# Patient Record
Sex: MALE | Race: WHITE | NOT HISPANIC OR LATINO | URBAN - METROPOLITAN AREA
[De-identification: names, ages, dates, MRNs, and addresses within clinical notes are randomized per-mention and may not be internally consistent; named-entity substitution may affect disease eponyms.]

---

## 2017-03-07 ENCOUNTER — OUTPATIENT (OUTPATIENT)
Dept: OUTPATIENT SERVICES | Facility: HOSPITAL | Age: 62
LOS: 1 days | End: 2017-03-07
Payer: MEDICARE

## 2017-03-07 ENCOUNTER — APPOINTMENT (OUTPATIENT)
Dept: BARIATRICS | Facility: CLINIC | Age: 62
End: 2017-03-07

## 2017-03-07 VITALS
BODY MASS INDEX: 47.84 KG/M2 | DIASTOLIC BLOOD PRESSURE: 76 MMHG | HEIGHT: 63 IN | SYSTOLIC BLOOD PRESSURE: 144 MMHG | WEIGHT: 270 LBS

## 2017-03-07 DIAGNOSIS — I87.8 OTHER SPECIFIED DISORDERS OF VEINS: ICD-10-CM

## 2017-03-07 DIAGNOSIS — Z00.00 ENCOUNTER FOR GENERAL ADULT MEDICAL EXAMINATION WITHOUT ABNORMAL FINDINGS: ICD-10-CM

## 2017-03-07 PROCEDURE — 74220 X-RAY XM ESOPHAGUS 1CNTRST: CPT | Mod: 26

## 2017-03-07 PROCEDURE — 74220 X-RAY XM ESOPHAGUS 1CNTRST: CPT

## 2017-03-07 PROCEDURE — 71020: CPT | Mod: 26

## 2017-03-07 PROCEDURE — 71046 X-RAY EXAM CHEST 2 VIEWS: CPT

## 2017-03-07 RX ORDER — OXYCODONE AND ACETAMINOPHEN 10; 325 MG/1; MG/1
10-325 TABLET ORAL
Qty: 200 | Refills: 0 | Status: ACTIVE | COMMUNITY
Start: 2016-10-25

## 2017-03-07 RX ORDER — CLOTRIMAZOLE AND BETAMETHASONE DIPROPIONATE 10; .5 MG/G; MG/G
1-0.05 CREAM TOPICAL
Qty: 45 | Refills: 0 | Status: ACTIVE | COMMUNITY
Start: 2015-11-17

## 2017-03-07 RX ORDER — LACTULOSE 10 G/15ML
10 SOLUTION ORAL
Qty: 473 | Refills: 0 | Status: ACTIVE | COMMUNITY
Start: 2016-09-12

## 2017-03-07 RX ORDER — GABAPENTIN 300 MG/1
300 CAPSULE ORAL
Qty: 300 | Refills: 0 | Status: ACTIVE | COMMUNITY
Start: 2016-09-22

## 2017-03-07 RX ORDER — DEXTROAMPHETAMINE SACCHARATE, AMPHETAMINE ASPARTATE, DEXTROAMPHETAMINE SULFATE AND AMPHETAMINE SULFATE 7.5; 7.5; 7.5; 7.5 MG/1; MG/1; MG/1; MG/1
30 TABLET ORAL
Qty: 60 | Refills: 0 | Status: ACTIVE | COMMUNITY
Start: 2016-10-03

## 2017-03-08 DIAGNOSIS — R07.89 OTHER CHEST PAIN: ICD-10-CM

## 2019-03-07 ENCOUNTER — APPOINTMENT (OUTPATIENT)
Dept: BARIATRICS | Facility: CLINIC | Age: 64
End: 2019-03-07
Payer: MEDICARE

## 2019-03-07 VITALS
BODY MASS INDEX: 53.52 KG/M2 | WEIGHT: 302.03 LBS | OXYGEN SATURATION: 95 % | DIASTOLIC BLOOD PRESSURE: 80 MMHG | HEIGHT: 63 IN | SYSTOLIC BLOOD PRESSURE: 140 MMHG | HEART RATE: 88 BPM

## 2019-03-07 PROCEDURE — 99214 OFFICE O/P EST MOD 30 MIN: CPT

## 2019-03-25 ENCOUNTER — OUTPATIENT (OUTPATIENT)
Dept: OUTPATIENT SERVICES | Facility: HOSPITAL | Age: 64
LOS: 1 days | End: 2019-03-25
Payer: MEDICARE

## 2019-03-25 VITALS
TEMPERATURE: 98 F | RESPIRATION RATE: 14 BRPM | DIASTOLIC BLOOD PRESSURE: 78 MMHG | HEIGHT: 69 IN | WEIGHT: 300.05 LBS | HEART RATE: 82 BPM | SYSTOLIC BLOOD PRESSURE: 126 MMHG | OXYGEN SATURATION: 99 %

## 2019-03-25 DIAGNOSIS — K95.09 OTHER COMPLICATIONS OF GASTRIC BAND PROCEDURE: ICD-10-CM

## 2019-03-25 DIAGNOSIS — Z98.84 BARIATRIC SURGERY STATUS: ICD-10-CM

## 2019-03-25 DIAGNOSIS — Z01.818 ENCOUNTER FOR OTHER PREPROCEDURAL EXAMINATION: ICD-10-CM

## 2019-03-25 DIAGNOSIS — Z98.1 ARTHRODESIS STATUS: Chronic | ICD-10-CM

## 2019-03-25 DIAGNOSIS — Z98.84 BARIATRIC SURGERY STATUS: Chronic | ICD-10-CM

## 2019-03-25 DIAGNOSIS — R11.10 VOMITING, UNSPECIFIED: ICD-10-CM

## 2019-03-25 DIAGNOSIS — Z98.890 OTHER SPECIFIED POSTPROCEDURAL STATES: Chronic | ICD-10-CM

## 2019-03-25 DIAGNOSIS — R91.8 OTHER NONSPECIFIC ABNORMAL FINDING OF LUNG FIELD: Chronic | ICD-10-CM

## 2019-03-25 DIAGNOSIS — Z90.89 ACQUIRED ABSENCE OF OTHER ORGANS: Chronic | ICD-10-CM

## 2019-03-25 DIAGNOSIS — Z96.641 PRESENCE OF RIGHT ARTIFICIAL HIP JOINT: Chronic | ICD-10-CM

## 2019-03-25 DIAGNOSIS — Z90.49 ACQUIRED ABSENCE OF OTHER SPECIFIED PARTS OF DIGESTIVE TRACT: Chronic | ICD-10-CM

## 2019-03-25 LAB
ANION GAP SERPL CALC-SCNC: 4 MMOL/L — LOW (ref 5–17)
BLD GP AB SCN SERPL QL: SIGNIFICANT CHANGE UP
BUN SERPL-MCNC: 16 MG/DL — SIGNIFICANT CHANGE UP (ref 7–23)
CALCIUM SERPL-MCNC: 9.6 MG/DL — SIGNIFICANT CHANGE UP (ref 8.4–10.5)
CHLORIDE SERPL-SCNC: 97 MMOL/L — SIGNIFICANT CHANGE UP (ref 96–108)
CO2 SERPL-SCNC: 34 MMOL/L — HIGH (ref 22–31)
CREAT SERPL-MCNC: 0.88 MG/DL — SIGNIFICANT CHANGE UP (ref 0.5–1.3)
GLUCOSE SERPL-MCNC: 110 MG/DL — HIGH (ref 70–99)
HCT VFR BLD CALC: 39 % — SIGNIFICANT CHANGE UP (ref 39–50)
HGB BLD-MCNC: 12.4 G/DL — LOW (ref 13–17)
MCHC RBC-ENTMCNC: 29.3 PG — SIGNIFICANT CHANGE UP (ref 27–34)
MCHC RBC-ENTMCNC: 31.8 GM/DL — LOW (ref 32–36)
MCV RBC AUTO: 92.2 FL — SIGNIFICANT CHANGE UP (ref 80–100)
NRBC # BLD: 0 /100 WBCS — SIGNIFICANT CHANGE UP (ref 0–0)
PLATELET # BLD AUTO: 279 K/UL — SIGNIFICANT CHANGE UP (ref 150–400)
POTASSIUM SERPL-MCNC: 4.6 MMOL/L — SIGNIFICANT CHANGE UP (ref 3.5–5.3)
POTASSIUM SERPL-SCNC: 4.6 MMOL/L — SIGNIFICANT CHANGE UP (ref 3.5–5.3)
RBC # BLD: 4.23 M/UL — SIGNIFICANT CHANGE UP (ref 4.2–5.8)
RBC # FLD: 15.2 % — HIGH (ref 10.3–14.5)
SODIUM SERPL-SCNC: 135 MMOL/L — SIGNIFICANT CHANGE UP (ref 135–145)
WBC # BLD: 9.14 K/UL — SIGNIFICANT CHANGE UP (ref 3.8–10.5)
WBC # FLD AUTO: 9.14 K/UL — SIGNIFICANT CHANGE UP (ref 3.8–10.5)

## 2019-03-25 PROCEDURE — G0463: CPT

## 2019-03-25 PROCEDURE — 86900 BLOOD TYPING SEROLOGIC ABO: CPT

## 2019-03-25 PROCEDURE — 93010 ELECTROCARDIOGRAM REPORT: CPT

## 2019-03-25 PROCEDURE — 86850 RBC ANTIBODY SCREEN: CPT

## 2019-03-25 PROCEDURE — 80048 BASIC METABOLIC PNL TOTAL CA: CPT

## 2019-03-25 PROCEDURE — 86901 BLOOD TYPING SEROLOGIC RH(D): CPT

## 2019-03-25 PROCEDURE — 93005 ELECTROCARDIOGRAM TRACING: CPT

## 2019-03-25 PROCEDURE — 85027 COMPLETE CBC AUTOMATED: CPT

## 2019-03-25 PROCEDURE — 36415 COLL VENOUS BLD VENIPUNCTURE: CPT

## 2019-03-25 RX ORDER — ALPRAZOLAM 0.25 MG
0 TABLET ORAL
Qty: 0 | Refills: 0 | COMMUNITY

## 2019-03-25 RX ORDER — DEXTROAMPHETAMINE SACCHARATE, AMPHETAMINE ASPARTATE, DEXTROAMPHETAMINE SULFATE AND AMPHETAMINE SULFATE 1.875; 1.875; 1.875; 1.875 MG/1; MG/1; MG/1; MG/1
0 TABLET ORAL
Qty: 0 | Refills: 0 | COMMUNITY

## 2019-03-25 RX ORDER — GABAPENTIN 400 MG/1
1 CAPSULE ORAL
Qty: 0 | Refills: 0 | COMMUNITY

## 2019-03-25 RX ORDER — OMEPRAZOLE 10 MG/1
1 CAPSULE, DELAYED RELEASE ORAL
Qty: 0 | Refills: 0 | COMMUNITY

## 2019-03-25 NOTE — H&P PST ADULT - HISTORY OF PRESENT ILLNESS
This is a 62 y/o male who had undergone lap band placement on 2009 presents with complaint of port site pain , nausea , dysphagia for the past 2 year . Diagnostic study revealed gastric band slippage . scheduled for lap band removal on 4/8/19

## 2019-03-25 NOTE — H&P PST ADULT - NSICDXFAMILYHX_GEN_ALL_CORE_FT
FAMILY HISTORY:  Family history of breast cancer in sister  Family history of type 2 diabetes mellitus in mother

## 2019-03-25 NOTE — H&P PST ADULT - NSICDXPASTMEDICALHX_GEN_ALL_CORE_FT
PAST MEDICAL HISTORY:  Chronic lower back pain PAST MEDICAL HISTORY:  Cellulitis, leg right leg    Chronic lower back pain     Leg swelling bilateral Lower extremities

## 2019-03-25 NOTE — H&P PST ADULT - GASTROINTESTINAL
Message  Return to work or school:   Yanni Christopher is under my professional care  She was seen in my office on 02/25/2016     She is able to return to school on 02/26/2016          Signatures   Electronically signed by :  Delonte Combs, ; Feb 29 2016 10:37AM EST                       (Author) details… detailed exam

## 2019-03-25 NOTE — H&P PST ADULT - NSICDXPASTSURGICALHX_GEN_ALL_CORE_FT
PAST SURGICAL HISTORY:  Mass of right lung excision of lung mass benign 1989    S/P appendectomy Age 12    S/P hip replacement, right 2003    S/P lumbar fusion 2015, 2017    S/P tonsillectomy at age 9    Status post gastric banding 2009 PAST SURGICAL HISTORY:  History of arthroscopic surgery of elbow left elbow 2015    Mass of right lung excision of lung mass benign 1989    S/P appendectomy Age 12    S/P hip replacement, right 2003    S/P lumbar fusion 2015, 2017    S/P tonsillectomy at age 9    Status post gastric banding 2009

## 2019-03-25 NOTE — H&P PST ADULT - NSICDXPROBLEM_GEN_ALL_CORE_FT
PROBLEM DIAGNOSES  Problem: Gastric band malfunction  Assessment and Plan: Laparoscopic removal of band   Medical clearance   Pre op instructions   Smoking cessation counselling

## 2019-03-25 NOTE — H&P PST ADULT - EXTREMITIES COMMENTS
right lower leg erythematic , 2+edema , healed ulcer right lower leg erythematic  2+edema , healed ulcer  left leg 1+edema , no calf tenderness

## 2019-03-26 PROBLEM — L03.119 CELLULITIS OF UNSPECIFIED PART OF LIMB: Chronic | Status: ACTIVE | Noted: 2019-03-25

## 2019-03-26 PROBLEM — M79.89 OTHER SPECIFIED SOFT TISSUE DISORDERS: Chronic | Status: ACTIVE | Noted: 2019-03-25

## 2019-03-27 NOTE — REASON FOR VISIT
[Follow-Up Visit] : a follow-up visit for [Morbid Obesity (BMI>40)] : morbid obesity (bmi>40) [S/P Bariatric Surgery] : s/p bariatric surgery [Band Adjustment] : band adjustment

## 2019-04-08 ENCOUNTER — APPOINTMENT (OUTPATIENT)
Dept: BARIATRICS | Facility: HOSPITAL | Age: 64
End: 2019-04-08

## 2019-04-08 NOTE — ASSESSMENT
[FreeTextEntry1] : 63 y F s/p lap band surgery APS. Pt here for follow up visit. Pt was last seen on 3/7/2017 with complaints of dysphagia with liquids and solids and daily vomiting ongoing for awhile. Continued complaints since last visit. Lap Band empty since 2017. Discuss removal of lap band and port secondary to ongoing issues. \par \par Dr. Dougherty saw pt. \par \par Risks, Benefits and alternatives to surgery have been been discussed . This includes but is not limited to bleeding, infection, damage to adjacent structures, need for additional surgery or interventions, adverse effects of anesthesia such as cardio respiratory complications, prolonged intubation, cardiac arrhythmia, arrest, and/or death. Risks before going surgery have also been discussed including progression of an/or worsening of current condition which may then require urgent or emergent treatment or surgery. Discussed weight regain after surgery.\par \par Plan to schedule surgery.  \par \par Plan to be cleared by PCP  prior to scheduling surgery. \par \par 25 minutes face-to-face time spent with patient with > 50 % of the time spent in coordination of  care. Return to office 1 week post - op.

## 2019-04-08 NOTE — HISTORY OF PRESENT ILLNESS
[Procedure: ___] : Procedure performed: [unfilled]  [Date of Surgery: ___] : Date of Surgery:   [unfilled] [Surgeon Name:   ___] : Surgeon Name: Dr. INIGUEZ [Pre-Op Weight ___] : Pre-op weight was [unfilled] lbs [de-identified] : 63 y F s/p lap band surgery APS. Pt here for follow up visit. Pt was last seen on 3/7/2017 with complaints of dysphagia with liquids and solids and daily vomiting ongoing for awhile. Esophagram performed at time of visit  indicated pouch dilatation- delay in passage of barium and tertiary contractions and tortuous esophagus. Fluid was removed at time of visit - lap band has been empty since visit. Pt was placed on  IV antibiotics for left lower extremity infection caused by venous stasis and instructed to follow up after being cleared by infectious disease.Denies port site pain. No change in BM. Pt continues to complain of ongoing issues and is now requesting to discuss removal of lap band and port due to continued complaints of dysphagia.

## 2019-04-08 NOTE — REVIEW OF SYSTEMS
[Recent Change In Weight] : ~T recent weight change [Dysphagia] : dysphagia [Lower Ext Edema] : lower extremity edema [Vomiting] : vomiting [Negative] : Psychiatric [Hoarseness] : no hoarseness [Chest Pain] : no chest pain [Palpitations] : no palpitations [Shortness Of Breath] : no shortness of breath [Wheezing] : no wheezing [SOB on Exertion] : no shortness of breath during exertion [Constipation] : no constipation [Diarrhea] : no diarrhea [FreeTextEntry2] : weight gain since last visit. [FreeTextEntry4] : liquids and solids daily [FreeTextEntry5] : bilateral lower extremity edema [FreeTextEntry7] : liquids and solids daily

## 2019-04-08 NOTE — PHYSICAL EXAM
[Obese, well nourished, in no acute distress] : obese, well nourished, in no acute distress [Normal] : affect appropriate [de-identified] : no active infection no calf tenderness , No JVD. [de-identified] : normoactive bowel sounds, soft and non tender, no hepatosplenomegaly or masses appreciated.

## 2019-04-16 ENCOUNTER — APPOINTMENT (OUTPATIENT)
Dept: BARIATRICS | Facility: CLINIC | Age: 64
End: 2019-04-16

## 2020-01-02 PROBLEM — M54.5 LOW BACK PAIN: Chronic | Status: ACTIVE | Noted: 2019-03-25

## 2020-01-09 ENCOUNTER — APPOINTMENT (OUTPATIENT)
Dept: BARIATRICS | Facility: CLINIC | Age: 65
End: 2020-01-09

## 2020-02-25 ENCOUNTER — APPOINTMENT (OUTPATIENT)
Dept: BARIATRICS | Facility: CLINIC | Age: 65
End: 2020-02-25
Payer: MEDICARE

## 2020-02-25 VITALS
WEIGHT: 264.33 LBS | SYSTOLIC BLOOD PRESSURE: 142 MMHG | BODY MASS INDEX: 46.84 KG/M2 | HEART RATE: 103 BPM | DIASTOLIC BLOOD PRESSURE: 86 MMHG | HEIGHT: 63 IN | OXYGEN SATURATION: 95 %

## 2020-02-25 DIAGNOSIS — E66.01 MORBID (SEVERE) OBESITY DUE TO EXCESS CALORIES: ICD-10-CM

## 2020-02-25 DIAGNOSIS — R11.2 NAUSEA WITH VOMITING, UNSPECIFIED: ICD-10-CM

## 2020-02-25 DIAGNOSIS — Z98.84 BARIATRIC SURGERY STATUS: ICD-10-CM

## 2020-02-25 DIAGNOSIS — E66.9 OBESITY, UNSPECIFIED: ICD-10-CM

## 2020-02-25 DIAGNOSIS — R13.10 DYSPHAGIA, UNSPECIFIED: ICD-10-CM

## 2020-02-25 PROCEDURE — 99214 OFFICE O/P EST MOD 30 MIN: CPT

## 2020-02-25 RX ORDER — DICLOFENAC SODIUM 50 MG/1
50 TABLET, DELAYED RELEASE ORAL
Qty: 60 | Refills: 0 | Status: DISCONTINUED | COMMUNITY
Start: 2016-07-01 | End: 2020-02-25

## 2020-02-25 RX ORDER — LOSARTAN POTASSIUM 100 MG/1
100 TABLET, FILM COATED ORAL
Qty: 30 | Refills: 0 | Status: DISCONTINUED | COMMUNITY
Start: 2016-04-02 | End: 2020-02-25

## 2020-02-25 RX ORDER — FUROSEMIDE 40 MG/1
40 TABLET ORAL
Qty: 90 | Refills: 0 | Status: DISCONTINUED | COMMUNITY
Start: 2016-11-07 | End: 2020-02-25

## 2020-02-25 RX ORDER — CEFADROXIL 500 MG/1
500 CAPSULE ORAL
Qty: 28 | Refills: 0 | Status: DISCONTINUED | COMMUNITY
Start: 2016-11-04 | End: 2020-02-25

## 2020-02-25 RX ORDER — VALACYCLOVIR 1 G/1
1 TABLET, FILM COATED ORAL
Qty: 30 | Refills: 0 | Status: DISCONTINUED | COMMUNITY
Start: 2016-09-16 | End: 2020-02-25

## 2020-02-25 RX ORDER — PREGABALIN 150 MG/1
150 CAPSULE ORAL
Qty: 90 | Refills: 0 | Status: DISCONTINUED | COMMUNITY
Start: 2016-12-04 | End: 2020-02-25

## 2020-02-25 RX ORDER — SULFAMETHOXAZOLE AND TRIMETHOPRIM 800; 160 MG/1; MG/1
800-160 TABLET ORAL
Qty: 28 | Refills: 0 | Status: DISCONTINUED | COMMUNITY
Start: 2016-11-04 | End: 2020-02-25

## 2020-02-25 RX ORDER — DICLOFENAC SODIUM 10 MG/G
1 GEL TOPICAL
Qty: 400 | Refills: 0 | Status: DISCONTINUED | COMMUNITY
Start: 2016-09-08 | End: 2020-02-25

## 2020-02-25 RX ORDER — CEPHALEXIN 500 MG/1
500 CAPSULE ORAL
Qty: 56 | Refills: 0 | Status: DISCONTINUED | COMMUNITY
Start: 2016-12-04 | End: 2020-02-25

## 2020-02-25 RX ORDER — AMLODIPINE BESYLATE 2.5 MG/1
2.5 TABLET ORAL
Qty: 30 | Refills: 0 | Status: DISCONTINUED | COMMUNITY
Start: 2016-05-07 | End: 2020-02-25

## 2020-02-25 RX ORDER — CIPROFLOXACIN HYDROCHLORIDE 500 MG/1
500 TABLET, FILM COATED ORAL
Qty: 10 | Refills: 0 | Status: DISCONTINUED | COMMUNITY
Start: 2017-02-23 | End: 2020-02-25

## 2020-02-25 RX ORDER — PRAMOXINE HYDROCHLORIDE AND HYDROCORTISONE ACETATE 10; 25 MG/G; MG/G
2.5-1 CREAM TOPICAL
Qty: 30 | Refills: 0 | Status: DISCONTINUED | COMMUNITY
Start: 2016-09-12 | End: 2020-02-25

## 2020-02-25 RX ORDER — POTASSIUM CHLORIDE 750 MG/1
10 TABLET, FILM COATED, EXTENDED RELEASE ORAL
Qty: 180 | Refills: 0 | Status: DISCONTINUED | COMMUNITY
Start: 2016-08-30 | End: 2020-02-25

## 2020-02-25 RX ORDER — FOLIC ACID 1 MG/1
1 TABLET ORAL
Qty: 100 | Refills: 0 | Status: DISCONTINUED | COMMUNITY
Start: 2016-11-28 | End: 2020-02-25

## 2020-02-25 RX ORDER — CYCLOBENZAPRINE HCL 10 MG
10 TABLET ORAL
Refills: 0 | Status: DISCONTINUED | COMMUNITY
End: 2020-02-25

## 2020-02-25 RX ORDER — AZITHROMYCIN 250 MG/1
250 TABLET, FILM COATED ORAL
Qty: 6 | Refills: 0 | Status: DISCONTINUED | COMMUNITY
Start: 2017-01-03 | End: 2020-02-25

## 2020-02-25 RX ORDER — KETOCONAZOLE 20 MG/G
2 CREAM TOPICAL
Qty: 60 | Refills: 0 | Status: DISCONTINUED | COMMUNITY
Start: 2017-01-23 | End: 2020-02-25

## 2020-02-25 RX ORDER — OMEPRAZOLE 20 MG/1
20 CAPSULE, DELAYED RELEASE ORAL
Qty: 60 | Refills: 0 | Status: DISCONTINUED | COMMUNITY
Start: 2016-04-05 | End: 2020-02-25

## 2020-02-25 RX ORDER — CLINDAMYCIN PHOSPHATE 10 MG/ML
1 SOLUTION TOPICAL
Qty: 60 | Refills: 0 | Status: DISCONTINUED | COMMUNITY
Start: 2016-07-11 | End: 2020-02-25

## 2020-02-25 RX ORDER — MORPHINE SULFATE 200 MG/1
200 TABLET, FILM COATED, EXTENDED RELEASE ORAL
Qty: 60 | Refills: 0 | Status: DISCONTINUED | COMMUNITY
Start: 2016-10-03 | End: 2020-02-25

## 2020-02-25 RX ORDER — ECONAZOLE NITRATE 10 MG/G
1 CREAM TOPICAL
Qty: 85 | Refills: 0 | Status: DISCONTINUED | COMMUNITY
Start: 2016-01-04 | End: 2020-02-25

## 2020-02-25 RX ORDER — ERYTHROMYCIN AND BENZOYL PEROXIDE 3 %-5 %
5-3 KIT TOPICAL
Qty: 466 | Refills: 0 | Status: DISCONTINUED | COMMUNITY
Start: 2016-09-08 | End: 2020-02-25

## 2020-02-25 RX ORDER — ALPRAZOLAM 1 MG/1
1 TABLET ORAL
Qty: 90 | Refills: 0 | Status: DISCONTINUED | COMMUNITY
Start: 2016-12-12 | End: 2020-02-25

## 2020-02-25 RX ORDER — MEROPENEM 1 G/30ML
1 INJECTION INTRAVENOUS
Qty: 25 | Refills: 0 | Status: DISCONTINUED | COMMUNITY
Start: 2017-02-21 | End: 2020-02-25

## 2020-02-25 RX ORDER — COLLAGENASE SANTYL 250 [ARB'U]/G
250 OINTMENT TOPICAL
Qty: 60 | Refills: 0 | Status: DISCONTINUED | COMMUNITY
Start: 2016-07-28 | End: 2020-02-25

## 2020-02-25 RX ORDER — OXYCODONE HYDROCHLORIDE AND ACETAMINOPHEN 10; 325 MG/1; MG/1
10-325 TABLET ORAL
Refills: 0 | Status: DISCONTINUED | COMMUNITY
End: 2020-02-25

## 2020-02-25 NOTE — REASON FOR VISIT
[Follow-Up Visit] : a follow-up visit for [Morbid Obesity (BMI>40)] : morbid obesity (bmi>40) [Nausea/Vomiting] : nausea/vomiting

## 2020-03-25 PROBLEM — E66.9 GENERALIZED OBESITY: Noted: 2017-03-07

## 2020-03-25 PROBLEM — R11.2 NAUSEA & VOMITING: Status: ACTIVE | Noted: 2019-03-27

## 2020-03-25 PROBLEM — R13.10 DYSPHAGIA: Status: ACTIVE | Noted: 2019-03-27

## 2020-03-25 PROBLEM — Z98.84 LAP-BAND SURGERY STATUS: Status: ACTIVE | Noted: 2017-03-06

## 2020-03-25 PROBLEM — E66.01 MORBID OBESITY: Status: ACTIVE | Noted: 2020-03-25

## 2020-03-30 ENCOUNTER — APPOINTMENT (OUTPATIENT)
Dept: BARIATRICS | Facility: HOSPITAL | Age: 65
End: 2020-03-30

## 2020-04-07 ENCOUNTER — APPOINTMENT (OUTPATIENT)
Dept: BARIATRICS | Facility: CLINIC | Age: 65
End: 2020-04-07

## 2020-05-07 NOTE — REVIEW OF SYSTEMS
[Recent Change In Weight] : ~T recent weight change [Dysphagia] : dysphagia [Lower Ext Edema] : lower extremity edema [Vomiting] : vomiting [Joint Pain] : joint pain [Joint Stiffness] : joint stiffness [Negative] : Psychiatric [Hoarseness] : no hoarseness [Chest Pain] : no chest pain [Shortness Of Breath] : no shortness of breath [Palpitations] : no palpitations [SOB on Exertion] : no shortness of breath during exertion [Wheezing] : no wheezing [Constipation] : no constipation [Diarrhea] : no diarrhea [FreeTextEntry4] : liquids and solids daily [FreeTextEntry2] : weight loss [FreeTextEntry5] : bilateral lower extremity edema [FreeTextEntry7] : liquids and solids daily

## 2020-05-07 NOTE — ASSESSMENT
[FreeTextEntry1] : 64 year old male s/p LAGB presents today to discuss rescheduling removal of lap band and port. Laparoscopic removal of lap band was recommended because of dysphagia and vomiting,  Lap band removal procedure risks (including weight regain) and benefits were discussed with patient. All questions were answered. He was advised to be on full liquid to soft mushy food diet until surgery. Patient expressed concern about being able to come back for PSTs given limited mobility and requiring someone to bring him to hospital. \par \par Schedule laparoscopic removal of lap band and port secondary to dysphagia and vomiting\par PST and Medical Clearance\par Call with any questions or concerns\par

## 2020-05-07 NOTE — HISTORY OF PRESENT ILLNESS
[Procedure: ___] : Procedure performed: [unfilled]  [Date of Surgery: ___] : Date of Surgery:   [unfilled] [Surgeon Name:   ___] : Surgeon Name: Dr. INIGUEZ [Pre-Op Weight ___] : Pre-op weight was [unfilled] lbs [de-identified] : 64 year old male s/p LAGB presents today to discuss rescheduling removal of lap band and port. Surgery was previously cancelled because of family and health issues. Patient is now complaining of port site tenderness especially when bending over. Patient continues to have dysphagia and stuck episodes with various types of foods despite the lap band being completely deflated since 2017. He regurgitates food multiple times a week. His activity is extremely limited secondary to joint pain especially after back surgery. Patient was unable to get here in time to have VE prior to visit.

## 2020-05-07 NOTE — PHYSICAL EXAM
[Obese, well nourished, in no acute distress] : obese, well nourished, in no acute distress [Normal] : affect appropriate [de-identified] : EOMI, anicteric [de-identified] : Soft and nontender, no hepatosplenomegaly, masses or hernias appreciated. Lap-band port site without erythema or tenderness.

## 2020-05-15 ENCOUNTER — APPOINTMENT (OUTPATIENT)
Dept: BARIATRICS | Facility: CLINIC | Age: 65
End: 2020-05-15

## 2020-05-15 ENCOUNTER — APPOINTMENT (OUTPATIENT)
Dept: BARIATRICS/WEIGHT MGMT | Facility: CLINIC | Age: 65
End: 2020-05-15
